# Patient Record
Sex: MALE | Race: BLACK OR AFRICAN AMERICAN
[De-identification: names, ages, dates, MRNs, and addresses within clinical notes are randomized per-mention and may not be internally consistent; named-entity substitution may affect disease eponyms.]

---

## 2018-07-14 ENCOUNTER — HOSPITAL ENCOUNTER (EMERGENCY)
Dept: HOSPITAL 72 - EMR | Age: 23
Discharge: HOME | End: 2018-07-14
Payer: MEDICAID

## 2018-07-14 VITALS — HEIGHT: 68 IN | WEIGHT: 190 LBS | BODY MASS INDEX: 28.79 KG/M2

## 2018-07-14 VITALS — DIASTOLIC BLOOD PRESSURE: 60 MMHG | SYSTOLIC BLOOD PRESSURE: 116 MMHG

## 2018-07-14 VITALS — DIASTOLIC BLOOD PRESSURE: 58 MMHG | SYSTOLIC BLOOD PRESSURE: 114 MMHG

## 2018-07-14 DIAGNOSIS — Z88.8: ICD-10-CM

## 2018-07-14 DIAGNOSIS — M54.89: ICD-10-CM

## 2018-07-14 DIAGNOSIS — M62.838: Primary | ICD-10-CM

## 2018-07-14 DIAGNOSIS — J45.909: ICD-10-CM

## 2018-07-14 DIAGNOSIS — R10.9: ICD-10-CM

## 2018-07-14 DIAGNOSIS — Z88.2: ICD-10-CM

## 2018-07-14 LAB
APPEARANCE UR: CLEAR
APTT PPP: YELLOW S
GLUCOSE UR STRIP-MCNC: NEGATIVE MG/DL
KETONES UR QL STRIP: NEGATIVE
LEUKOCYTE ESTERASE UR QL STRIP: (no result)
NITRITE UR QL STRIP: NEGATIVE
PH UR STRIP: 8 [PH] (ref 4.5–8)
PROT UR QL STRIP: NEGATIVE
SP GR UR STRIP: 1.02 (ref 1–1.03)
UROBILINOGEN UR-MCNC: NORMAL MG/DL (ref 0–1)

## 2018-07-14 PROCEDURE — 76870 US EXAM SCROTUM: CPT

## 2018-07-14 PROCEDURE — 99284 EMERGENCY DEPT VISIT MOD MDM: CPT

## 2018-07-14 PROCEDURE — 81003 URINALYSIS AUTO W/O SCOPE: CPT

## 2018-07-14 NOTE — DIAGNOSTIC IMAGING REPORT
History: PAIN

 

Exam: US SCROTAL

 

Comparison: None available

 

FINDINGS:

 

The right testicle measures 3.4 x 2.3 x 2.8 cm.  The left testicle 

measures 2.7 x 1.4 x 3.7 cm.  The testicles appear isoechoic and 

symmetric without mass or evidence of focal abnormality.  Bilateral 

testicular flow is noted.

 

No hydrocele.  No evidence of hernia identified.

 

Right and left epididymis appears within limits.

 

IMPRESSION:

 

No evidence of sonographic abnormality.

## 2018-07-14 NOTE — EMERGENCY ROOM REPORT
History of Present Illness


General


Chief Complaint:  Back Pain-No Injury


Source:  Patient





Present Illness


HPI


23-year-old male patient presents to ER complaining of right-sided groin pain 

and left buttock pain.  Reports pain began yesterday and it remained since that 

time.  Reports groin pain has decreased while pain in left buttocks has 

increased.  Reports pain in left buttock hurts more with sitting and with 

movement. reports "feels like a knot", feels like a spasm, as read in size and 

become more "loose and less tight" since onset. Denies fever, chest pain, 

shortness of breath, abdominal pain.  Denies dysuria, hematuria.  Denies 

history of trauma or incident.  Reports normal bowel and bladder movements, 

able to pass flatus. denies testicular swelling.  Denies penile pain or 

discharge. Denies concern for STI.


Allergies:  


Coded Allergies:  


     CHLORAL HYDRATE (Verified  Allergy, Severe, Anaphylaxis, 9/19/13)


     SULFA (SULFONAMIDE ANTIBIOTICS) (Verified  Allergy, Severe, Hives, 9/19/13)


     CEFIXIME (Verified  Allergy, Unknown, 9/19/13)





Patient History


Past Medical History:  see triage record


Reviewed Nursing Documentation:  PMH: Agreed; PSxH: Agreed





Nursing Documentation-PMH


Past Medical History:  No History, Except For


Hx Cardiac Problems:  Yes


Hx Hypertension:  No


Hx Pacemaker:  No


Hx Asthma:  Yes


Hx COPD:  No


Hx Diabetes:  No


Hx Cancer:  No


Hx Gastrointestinal Problems:  No


Hx Dialysis:  No


History Of Psychiatric Problem:  No


Hx Neurological Problems:  No


Hx Cerebrovascular Accident:  No


Hx Seizures:  Yes





Review of Systems


All Other Systems:  negative except mentioned in HPI





Physical Exam





Vital Signs








  Date Time  Temp Pulse Resp B/P (MAP) Pulse Ox O2 Delivery O2 Flow Rate FiO2


 


7/14/18 12:20 99.0 104 18 116/60 97 Room Air  





 99.0       








Sp02 EP Interpretation:  reviewed, normal


General Appearance:  well appearing, no apparent distress, alert, GCS 15, non-

toxic


Head:  normocephalic, atraumatic


Eyes:  bilateral eye normal inspection, bilateral eye PERRL


ENT:  hearing grossly normal, normal pharynx, no angioedema, normal voice, 

uvula midline, moist mucus membranes


Neck:  full range of motion


Respiratory:  lungs clear, normal breath sounds, no rhonchi, no respiratory 

distress, no accessory muscle use, no wheezing, speaking full sentences


Cardiovascular #1:  regular rate, rhythm, no edema


Gastrointestinal:  non tender, soft, no mass, non-distended, no guarding, no 

rebound


Genitourinary:  no CVA tenderness, penis normal - circumcised, scrotum normal, 

other - cremasteric intact bilaterally


Musculoskeletal:  digits/nails normal, gait/station normal, normal range of 

motion, non-tender, tender - medial left buttock near midline


Skin:  no rash





Medical Decision Making


PA Attestation


Dr. Prater is my supervising Physician whom patient management has been 

discussed with.


Diagnostic Impression:  


 Primary Impression:  


 Muscle spasm


ER Course


Pt. presents to the ED c/o groin and left buttock pain.





Ddx considered but are not limited to testicular torsion, epididymitis, UTI, 

testicular cyst, varicocele, hydrocele, inguinal hernia, muscle spasm, abscess, 

kidney stones.


No flank pain, no fever, no vomiting, no painful urination, low suspicion for 

kidney stones, does not require imaging or labs at this time.


No high riding testicle, cremasteric reflex intact, no evidence of testicular 

torsion.


denies recent sexual activity, low suspicion for STI.  If concern for STI follow

-up STI clinic or PCP for further testing and treatment as needed.


will order testicular ultrasound to rule out underlying pathology.





Vital signs: are WNL, pt. is afebrile





ORDERS: 


-Scrotal US


-UA: results negative





ER COURSE:


Provided with pain medication.


pain appears to be worse with movement and with laughing. 


UA unremarkable, no signs of infection.


Testicular ultrasound negative for acute disease, no varicocele, hernia,  

torsion.  


Patient reports pain symptoms have improved since arrival to ER.Likely muscle 

spasm is causing pain.


Patient seen and evaluated by , agrees with assessment and plan.


Instructed patient on rest, ice, heat.


ER precautions given.


patient able ambulate independently with steady gait out of the ER without 

difficulty.





DISCHARGE:


Rx provided for Ibuprofen


Rx provided for Lidocaine patch


Rx provided for Robaxin





At this time pt. is stable for d/c to home. Resting comfortably, in no acute 

distress, nontoxic appearing.


Will provide printed patient care instructions, and any necessary 

prescriptions. 


Care plan and follow up instructions have been discussed with the patient prior 

to discharge.


Followup with pediatrician in 3 -5 days.


Followup with urologist.


Take medications as directed. 


Patient questions asked and answered.


ER precautions given, patient instructed to return to ER immediately for any 

new or worsening of symptoms including but not limited to fever, nausea, 

vomiting, worsening of pain.





- Please note that this Emergency Department Report was dictated using [employer] technology software, occasionally this can lead to 

erroneous entry secondary to interpretation by the dictation equipment.





Labs








Test


  7/14/18


13:00


 


Urine Color Yellow 


 


Urine Appearance Clear 


 


Urine pH 8 (4.5-8.0) 


 


Urine Specific Gravity


  1.020


(1.005-1.035)


 


Urine Protein


  Negative


(NEGATIVE)


 


Urine Glucose (UA)


  Negative


(NEGATIVE)


 


Urine Ketones


  Negative


(NEGATIVE)


 


Urine Occult Blood


  Negative


(NEGATIVE)


 


Urine Nitrite


  Negative


(NEGATIVE)


 


Urine Bilirubin


  Negative


(NEGATIVE)


 


Urine Urobilinogen


  Normal MG/DL


(0.0-1.0)


 


Urine Leukocyte Esterase 1+ (NEGATIVE) 


 


Urine RBC


  0-2 /HPF (0 -


0)


 


Urine WBC


  2-4 /HPF (0 -


0)


 


Urine Squamous Epithelial


Cells Occasional


/LPF


 


Urine Amorphous Sediment


  Moderate /LPF


(NONE)


 


Urine Bacteria


  Few /HPF


(NONE)











Last Vital Signs








  Date Time  Temp Pulse Resp B/P (MAP) Pulse Ox O2 Delivery O2 Flow Rate FiO2


 


7/14/18 12:20 99.0 104 18 116/60 97 Room Air  





 99.0       








Status:  improved


Disposition:  HOME, SELF-CARE


Condition:  Stable


Scripts


Methocarbamol* (ROBAXIN*) 500 Mg Tablet


500 MG PO TID, #21 TAB 0 Refills


   Prov: Phoenix Herrera.A.         7/14/18 


Ibuprofen* (MOTRIN*) 600 Mg Tablet


600 MG ORAL THREE TIMES A DAY, #30 TAB 0 Refills


   Prov: Phoenix Herrera.A.         7/14/18 


Lidocaine (Lidocaine) 1 Each Adh..patch


700 MG TP DAILY for 7 Days, #7 PATCH


   Prov: Phoenix Herrera.A.         7/14/18


Patient Instructions:  Muscle Cramps and Spasms, Easy-to-Read





Additional Instructions:  


Patient instructed to follow up with primary care provider 3-5 and discuss 

further referral and imaging at that time.


Patient instructed on rest, ice and heat.


Do not take muscle relaxant prior to drinking, driving, or operating heavy 

machinery.


Take medications as directed. 


Patient questions asked and answered.


ER precautions given, patient instructed to return to ER immediately for any 

new or worsening of symptoms.











Phoenix Herrera Jul 14, 2018 12:49